# Patient Record
Sex: MALE | Race: WHITE | Employment: FULL TIME | URBAN - METROPOLITAN AREA
[De-identification: names, ages, dates, MRNs, and addresses within clinical notes are randomized per-mention and may not be internally consistent; named-entity substitution may affect disease eponyms.]

---

## 2020-12-30 ENCOUNTER — HOSPITAL ENCOUNTER (EMERGENCY)
Age: 30
Discharge: HOME | End: 2020-12-30
Attending: EMERGENCY MEDICINE
Payer: COMMERCIAL

## 2020-12-30 ENCOUNTER — APPOINTMENT (OUTPATIENT)
Dept: GENERAL RADIOLOGY | Age: 30
End: 2020-12-30
Attending: PHYSICIAN ASSISTANT
Payer: COMMERCIAL

## 2020-12-30 VITALS
DIASTOLIC BLOOD PRESSURE: 82 MMHG | TEMPERATURE: 98.2 F | HEIGHT: 69 IN | SYSTOLIC BLOOD PRESSURE: 140 MMHG | OXYGEN SATURATION: 100 % | RESPIRATION RATE: 17 BRPM | HEART RATE: 64 BPM | WEIGHT: 150 LBS | BODY MASS INDEX: 22.22 KG/M2

## 2020-12-30 DIAGNOSIS — S43.402A SPRAIN OF LEFT SHOULDER, UNSPECIFIED SHOULDER SPRAIN TYPE, INITIAL ENCOUNTER: Primary | ICD-10-CM

## 2020-12-30 PROCEDURE — 99283 EMERGENCY DEPT VISIT LOW MDM: CPT

## 2020-12-30 PROCEDURE — 73030 X-RAY EXAM OF SHOULDER: CPT

## 2020-12-30 NOTE — ED TRIAGE NOTES
Pt w/ c/o LEFT shoulder injury while playing football, states he caught a football and landed onto his shoulder then his brother landed on top of him, causing a crushing feeling to his shoulder.

## 2020-12-30 NOTE — ED PROVIDER NOTES
EMERGENCY DEPARTMENT HISTORY AND PHYSICAL EXAM    Date: 12/30/2020  Patient Name: Rachele Severs    History of Presenting Illness     Chief Complaint   Patient presents with    Shoulder Injury         History Provided By: Patient    5:59 PM  Rachele Severs is a 27 y.o. male with PMHX of right AC separation who presents to the emergency department C/O left shoulder pain. Patient states approximately 1 hour prior to arrival he was playing backyard football with his brother when he fell, landing on his left shoulder and his brother fell on top of him. He felt immediate pain and heard some kind of noise from his left shoulder. Pain feels better when supported by his other arm. Pt denies head injury, loss of consciousness, neck pain, back pain, chest pain, extremity numbness or weakness, and any other sxs or complaints. PCP: Justin Pardo MD        Past History     Past Medical History:  History reviewed. No pertinent past medical history. Past Surgical History:  History reviewed. No pertinent surgical history. Family History:  History reviewed. No pertinent family history. Social History:  Social History     Tobacco Use    Smoking status: Never Smoker    Smokeless tobacco: Never Used   Substance Use Topics    Alcohol use: Yes    Drug use: Never       Allergies:  No Known Allergies      Review of Systems   Review of Systems   Musculoskeletal: Positive for arthralgias and myalgias. Negative for back pain and neck pain. Neurological: Negative for weakness and numbness. All other systems reviewed and are negative. Physical Exam     Vitals:    12/30/20 1741   BP: (!) 140/82   Pulse: 64   Resp: 17   Temp: 98.2 °F (36.8 °C)   SpO2: 100%   Weight: 68 kg (150 lb)   Height: 5' 9\" (1.753 m)     Physical Exam  Vitals signs and nursing note reviewed. Constitutional:       Appearance: Normal appearance. HENT:      Head: Normocephalic and atraumatic.    Musculoskeletal:      Right shoulder: Normal.      Left elbow: Normal.      Cervical back: Normal.      Thoracic back: Normal.      Left forearm: Normal.        Arms:    Skin:     General: Skin is warm and dry. Neurological:      General: No focal deficit present. Mental Status: He is alert and oriented to person, place, and time. Psychiatric:         Mood and Affect: Mood normal.         Behavior: Behavior normal.               Diagnostic Study Results     Labs -   No results found for this or any previous visit (from the past 12 hour(s)). Radiologic Studies -   X-ray left shoulder shows no acute process  Pending review by radiologist  XR SHOULDER LT AP/LAT MIN 2 V    (Results Pending)     CT Results  (Last 48 hours)    None        CXR Results  (Last 48 hours)    None          Medications given in the ED-  Medications - No data to display      Medical Decision Making   I am the first provider for this patient. I reviewed the vital signs, available nursing notes, past medical history, past surgical history, family history and social history. Vital Signs-Reviewed the patient's vital signs. Records Reviewed: Nursing Notes      Procedures:  Procedures    ED Course:  5:59 PM   Initial assessment performed. The patients presenting problems have been discussed, and they are in agreement with the care plan formulated and outlined with them. I have encouraged them to ask questions as they arise throughout their visit. Provider Notes (Medical Decision Making): Viet Daly is a 27 y.o. male presents with left shoulder pain after falling onto his side 1 hour prior to arrival.  Neurovascular intact without deformity or evidence of trauma. Generalized tenderness to lateral shoulder and AC joint. X-ray left shoulder shows no acute process. Exam is consistent with shoulder sprain, ligamentous injury versus mild AC separation not excluded.   Recommend rest, use sling as needed for the next 2 to 3 days, alternate Tylenol and ibuprofen as needed for pain, patient declined anything stronger. He is already established with Orthopedist  Dr. Steve Vega and will follow-up if his symptoms do not improve after 1 to 2 weeks. Diagnosis and Disposition       DISCHARGE NOTE:    Andrew Ledesma's  results have been reviewed with him. He has been counseled regarding his diagnosis, treatment, and plan. He verbally conveys understanding and agreement of the signs, symptoms, diagnosis, treatment and prognosis and additionally agrees to follow up as discussed. He also agrees with the care-plan and conveys that all of his questions have been answered. I have also provided discharge instructions for him that include: educational information regarding their diagnosis and treatment, and list of reasons why they would want to return to the ED prior to their follow-up appointment, should his condition change. He has been provided with education for proper emergency department utilization. CLINICAL IMPRESSION:    1. Sprain of left shoulder, unspecified shoulder sprain type, initial encounter        PLAN:  1. D/C Home  2. There are no discharge medications for this patient. 3.   Follow-up Information     Follow up With Specialties Details Why Contact Info    Bunny Pepe MD Orthopedic Surgery In 1 week  [address] [facility] [address] [phone]      [facility] EMERGENCY DEPT Emergency Medicine  As needed, If symptoms worsen [address] [place] [id number]  [phone]        _______________________________      Please note that this dictation was completed with [employer], the computer voice recognition software. Quite often unanticipated grammatical, syntax, homophones, and other interpretive errors are inadvertently transcribed by the computer software. Please disregard these errors. Please excuse any errors that have escaped final proofreading.